# Patient Record
(demographics unavailable — no encounter records)

---

## 2025-06-09 NOTE — HISTORY OF PRESENT ILLNESS
[FreeTextEntry1] : new annual [de-identified] : 34 yo F with hx herniated discs-- L2-L3 and C4-C5. Pt under care of neurologist. Has frequent headaches that is only relieved with baclofen. Pt unable to lift more than 25 lbs. Pt also with vertigo-- seen ENT, was told "vertigo but not vertigo".   Pt reports low energy. Sleeps 3-4 hours/night. Since 20s, typically works 2 jobs. Was under a lot of stress, ended up leaving daytime position as night time job was less stressful. Does tend to overthink. Denies snoring/nonrestorative sleep. Pt did gain some weight. Has been on duloxetine and venlafaxine in past for back pain-- but did not tolerate the meds.

## 2025-06-09 NOTE — HEALTH RISK ASSESSMENT
[Poor] : ~his/her~ current health as poor [No falls in past year] : Patient reported no falls in the past year [Patient reported PAP Smear was normal] : Patient reported PAP Smear was normal [Fully functional (bathing, dressing, toileting, transferring, walking, feeding)] : Fully functional (bathing, dressing, toileting, transferring, walking, feeding) [Fully functional (using the telephone, shopping, preparing meals, housekeeping, doing laundry, using] : Fully functional and needs no help or supervision to perform IADLs (using the telephone, shopping, preparing meals, housekeeping, doing laundry, using transportation, managing medications and managing finances) [Reports normal functional visual acuity (ie: able to read med bottle)] : Reports normal functional visual acuity [Never] : Never [NO] : No [de-identified] : walking - commuting and walkind pad x 30 min [de-identified] : lunch and dinner [Behavior] : denies difficulty with behavior [Language] : denies difficulty with language [Learning/Retaining New Information] : denies difficulty learning/retaining new information [Handling Complex Tasks] : denies difficulty handling complex tasks [Reasoning] : denies difficulty with reasoning [Spatial Ability and Orientation] : denies difficulty with spatial ability and orientation [Reports changes in hearing] : Reports no changes in hearing [Reports changes in vision] : Reports no changes in vision [Reports changes in dental health] : Reports no changes in dental health [PapSmearDate] : 1/25

## 2025-06-09 NOTE — CURRENT MEDS
[Yes] : Reviewed medication list for presence of high-risk medications. [Benzodiazepines] : benzodiazepines [Opioids] : opioids [Muscle Relaxants] : muscle relaxants [Blood Thinners] : blood thinners [Sedatives] : sedatives